# Patient Record
Sex: FEMALE | URBAN - METROPOLITAN AREA
[De-identification: names, ages, dates, MRNs, and addresses within clinical notes are randomized per-mention and may not be internally consistent; named-entity substitution may affect disease eponyms.]

---

## 2023-09-26 ENCOUNTER — OFFICE VISIT (OUTPATIENT)
Dept: URBAN - METROPOLITAN AREA CLINIC 35 | Facility: CLINIC | Age: 33
End: 2023-09-26
Payer: COMMERCIAL

## 2023-09-26 ENCOUNTER — LAB OUTSIDE AN ENCOUNTER (OUTPATIENT)
Dept: URBAN - METROPOLITAN AREA CLINIC 35 | Facility: CLINIC | Age: 33
End: 2023-09-26

## 2023-09-26 VITALS — HEIGHT: 62 IN | BODY MASS INDEX: 25.4 KG/M2 | WEIGHT: 138 LBS

## 2023-09-26 DIAGNOSIS — R14.0 ABDOMINAL BLOATING: ICD-10-CM

## 2023-09-26 DIAGNOSIS — K57.30 COLON, DIVERTICULOSIS: ICD-10-CM

## 2023-09-26 PROBLEM — 116289008: Status: ACTIVE | Noted: 2023-09-26

## 2023-09-26 PROBLEM — 301716002: Status: ACTIVE | Noted: 2023-09-26

## 2023-09-26 PROBLEM — 87522002: Status: ACTIVE | Noted: 2023-09-26

## 2023-09-26 PROCEDURE — 99204 OFFICE O/P NEW MOD 45 MIN: CPT | Performed by: INTERNAL MEDICINE

## 2023-09-26 NOTE — HPI-BLOATING/GAS
33 year old female Patient complains of bloating. Patient admits symptoms are more present after eating certain foods. Patient admits symptoms have been present since.  Patient admits /denies taking any OTC medications to relieve symptoms. Patient describes symptoms as ___. Patient admits /denies having any previous breath test including H. Pylori or SIBO ..

## 2023-09-26 NOTE — HPI-ABNORMAL FINDINGS
33 year old female patient presents for abnormal findings . Pt began having severe abdominal pian while traveling in [facility] during the month of June . Patient went to hospital for evaluation and had Labs , Abdominal u/s and CT scan completed . Ct revealed Acute sigmoiditis complicated by 30mm pericolic abscess w/ pelvic peritonitis. Diverticulosis in  Sigmoid . Ptient states she stgayed in hospital for 5 days and was treated with antibiotics . Patient denies any current symptoms at this time . She states she hasnt had an episode of severe abdominal pain again. She denies havng colonoscopy previously . She admits family hx of colon cancer . She denies family hx of IBD .   She denies any associated weight loss . She admits bowel habits are irregular. She denies daily bowel movement and she states stools are normal once she is ready to have a bowel movement.   She denies any rectal bleeding or mucus .

## 2023-09-27 ENCOUNTER — OFFICE VISIT (OUTPATIENT)
Dept: URBAN - METROPOLITAN AREA CLINIC 35 | Facility: CLINIC | Age: 33
End: 2023-09-27

## 2023-10-10 ENCOUNTER — TELEPHONE ENCOUNTER (OUTPATIENT)
Dept: URBAN - METROPOLITAN AREA CLINIC 35 | Facility: CLINIC | Age: 33
End: 2023-10-10

## 2023-10-27 ENCOUNTER — OFFICE VISIT (OUTPATIENT)
Dept: URBAN - METROPOLITAN AREA CLINIC 35 | Facility: CLINIC | Age: 33
End: 2023-10-27
Payer: COMMERCIAL

## 2023-10-27 VITALS
DIASTOLIC BLOOD PRESSURE: 68 MMHG | WEIGHT: 139 LBS | BODY MASS INDEX: 25.58 KG/M2 | HEIGHT: 62 IN | SYSTOLIC BLOOD PRESSURE: 106 MMHG

## 2023-10-27 DIAGNOSIS — R10.32 LEFT LOWER QUADRANT ABDOMINAL PAIN: ICD-10-CM

## 2023-10-27 DIAGNOSIS — K57.30 COLON, DIVERTICULOSIS: ICD-10-CM

## 2023-10-27 DIAGNOSIS — L29.0 RECTAL ITCHING: ICD-10-CM

## 2023-10-27 PROBLEM — 90446007: Status: ACTIVE | Noted: 2023-10-27

## 2023-10-27 PROCEDURE — 99212 OFFICE O/P EST SF 10 MIN: CPT | Performed by: INTERNAL MEDICINE

## 2023-10-27 NOTE — HPI-ABNORMAL FINDINGS
Patient presents for follow up of imaging . Patient had CT sca completed on 10/02 with results documented . Patient denies any abdominal pain at this time . She states after CT she had severe abdominal paina nd body aches . Patient admits those symptoms subsided . She denies any bowel changes and admits normal bowel habits .  CT Abdomen+Pelvis (10/02/2023) Apparent slight wall thickening of the proximal sigmoid colon with the segment containing multiple diverticuli, which could be due to underdistention though mild diverticulitis cannot be excluded. No significant pericolonic inflammatory changes . No pericolonic abscess.   Of last visit (09/26/2023) 33 year old female patient presents for abnormal findings . Pt began having severe abdominal pian while traveling in [facility] during the month of June . Patient went to hospital for evaluation and had Labs , Abdominal u/s and CT scan completed . Ct revealed Acute sigmoiditis complicated by 30mm pericolic abscess w/ pelvic peritonitis. Diverticulosis in  Sigmoid . Ptient states she stgayed in hospital for 5 days and was treated with antibiotics . Patient denies any current symptoms at this time . She states she hasnt had an episode of severe abdominal pain again. She denies havng colonoscopy previously . She admits family hx of colon cancer . She denies family hx of IBD .   She denies any associated weight loss . She admits bowel habits are irregular. She denies daily bowel movement and she states stools are normal once she is ready to have a bowel movement.   She denies any rectal bleeding or mucus .

## 2023-12-08 ENCOUNTER — OFFICE VISIT (OUTPATIENT)
Dept: URBAN - METROPOLITAN AREA CLINIC 35 | Facility: CLINIC | Age: 33
End: 2023-12-08
Payer: COMMERCIAL

## 2023-12-08 ENCOUNTER — DASHBOARD ENCOUNTERS (OUTPATIENT)
Age: 33
End: 2023-12-08

## 2023-12-08 VITALS
SYSTOLIC BLOOD PRESSURE: 112 MMHG | BODY MASS INDEX: 25.95 KG/M2 | HEIGHT: 62 IN | HEART RATE: 83 BPM | WEIGHT: 141 LBS | DIASTOLIC BLOOD PRESSURE: 58 MMHG | OXYGEN SATURATION: 100 %

## 2023-12-08 DIAGNOSIS — R10.32 LEFT LOWER QUADRANT ABDOMINAL PAIN: ICD-10-CM

## 2023-12-08 DIAGNOSIS — K57.32 SIGMOID DIVERTICULITIS: ICD-10-CM

## 2023-12-08 PROBLEM — 427910000: Status: ACTIVE | Noted: 2023-12-08

## 2023-12-08 PROCEDURE — 99213 OFFICE O/P EST LOW 20 MIN: CPT | Performed by: INTERNAL MEDICINE

## 2023-12-08 RX ORDER — DICYCLOMINE HYDROCHLORIDE 20 MG/1
1 TABLET TABLET ORAL THREE TIMES A DAY
Qty: 45 TABLET | Refills: 1 | OUTPATIENT
Start: 2023-12-08 | End: 2024-01-07

## 2023-12-08 RX ORDER — TAMSULOSIN HYDROCHLORIDE 0.4 MG/1
1 CAPSULE CAPSULE ORAL ONCE A DAY
Status: ACTIVE | COMMUNITY

## 2023-12-08 RX ORDER — AMOXICILLIN AND CLAVULANATE POTASSIUM 875; 125 MG/1; MG/1
1 TABLET TABLET, FILM COATED ORAL
Status: ACTIVE | COMMUNITY

## 2023-12-08 RX ORDER — DICYCLOMINE HYDROCHLORIDE 20 MG/1
1 TABLET TABLET ORAL THREE TIMES A DAY
Status: ACTIVE | COMMUNITY

## 2023-12-08 NOTE — HPI-ABDOMINAL PAIN
33 year old female patient presents for abdominal pain f/u. She admits improvement since last visit but symptoms returned 2 days ago . Patient went to urgent care due to severity of pain. She was prescribed Tamsulosin in case of kidney stones being present , she was also prescribed Augmentin and dicyclomine for possible diverticulitis flare .  She admits nausea  without vomiting . She denies fever .   Patient denies any imaging since previous CT of Abdomen in October .

## 2023-12-29 ENCOUNTER — OFFICE VISIT (OUTPATIENT)
Dept: URBAN - METROPOLITAN AREA CLINIC 35 | Facility: CLINIC | Age: 33
End: 2023-12-29

## 2023-12-29 NOTE — HPI-ABDOMINAL PAIN
Patient presents for follow up of abdominal pain follow up. Patient admits /denies trying dicyclomine 20mg as needed for relief .       Of last visit (12/08/2023)     33 year old female patient presents for abdominal pain f/u. She admits improvement since last visit but symptoms returned 2 days ago . Patient went to urgent care due to severity of pain. She was prescribed Tamsulosin in case of kidney stones being present , she was also prescribed Augmentin and dicyclomine for possible diverticulitis flare .  She admits nausea  without vomiting . She denies fever .   Patient denies any imaging since previous CT of Abdomen in October .

## 2024-01-05 ENCOUNTER — OFFICE VISIT (OUTPATIENT)
Dept: URBAN - METROPOLITAN AREA CLINIC 35 | Facility: CLINIC | Age: 34
End: 2024-01-05

## 2024-02-02 ENCOUNTER — OV EP (OUTPATIENT)
Dept: URBAN - METROPOLITAN AREA CLINIC 35 | Facility: CLINIC | Age: 34
End: 2024-02-02